# Patient Record
Sex: MALE | Race: WHITE | NOT HISPANIC OR LATINO | Employment: OTHER | URBAN - METROPOLITAN AREA
[De-identification: names, ages, dates, MRNs, and addresses within clinical notes are randomized per-mention and may not be internally consistent; named-entity substitution may affect disease eponyms.]

---

## 2021-04-19 ENCOUNTER — HOSPITAL ENCOUNTER (EMERGENCY)
Facility: CLINIC | Age: 31
Discharge: HOME | End: 2021-04-19
Attending: EMERGENCY MEDICINE | Admitting: EMERGENCY MEDICINE
Payer: COMMERCIAL

## 2021-04-19 ENCOUNTER — APPOINTMENT (OUTPATIENT)
Dept: GENERAL RADIOLOGY | Facility: CLINIC | Age: 31
End: 2021-04-19
Attending: EMERGENCY MEDICINE
Payer: COMMERCIAL

## 2021-04-19 VITALS
HEART RATE: 77 BPM | RESPIRATION RATE: 13 BRPM | TEMPERATURE: 98 F | OXYGEN SATURATION: 100 % | SYSTOLIC BLOOD PRESSURE: 131 MMHG | DIASTOLIC BLOOD PRESSURE: 81 MMHG

## 2021-04-19 DIAGNOSIS — R07.9 CHEST PAIN, UNSPECIFIED TYPE: ICD-10-CM

## 2021-04-19 LAB
ANION GAP SERPL CALCULATED.3IONS-SCNC: 4 MMOL/L (ref 3–14)
BASOPHILS # BLD AUTO: 0.1 10E9/L (ref 0–0.2)
BASOPHILS NFR BLD AUTO: 0.8 %
BUN SERPL-MCNC: 9 MG/DL (ref 7–30)
CALCIUM SERPL-MCNC: 9.2 MG/DL (ref 8.5–10.1)
CHLORIDE SERPL-SCNC: 106 MMOL/L (ref 94–109)
CO2 SERPL-SCNC: 28 MMOL/L (ref 20–32)
CREAT SERPL-MCNC: 0.89 MG/DL (ref 0.66–1.25)
DIFFERENTIAL METHOD BLD: ABNORMAL
EOSINOPHIL # BLD AUTO: 0.8 10E9/L (ref 0–0.7)
EOSINOPHIL NFR BLD AUTO: 10.4 %
ERYTHROCYTE [DISTWIDTH] IN BLOOD BY AUTOMATED COUNT: 12.5 % (ref 10–15)
GFR SERPL CREATININE-BSD FRML MDRD: >90 ML/MIN/{1.73_M2}
GLUCOSE SERPL-MCNC: 96 MG/DL (ref 70–99)
HCT VFR BLD AUTO: 39.6 % (ref 40–53)
HGB BLD-MCNC: 12.9 G/DL (ref 13.3–17.7)
IMM GRANULOCYTES # BLD: 0 10E9/L (ref 0–0.4)
IMM GRANULOCYTES NFR BLD: 0.1 %
LYMPHOCYTES # BLD AUTO: 2.2 10E9/L (ref 0.8–5.3)
LYMPHOCYTES NFR BLD AUTO: 29.6 %
MCH RBC QN AUTO: 29.1 PG (ref 26.5–33)
MCHC RBC AUTO-ENTMCNC: 32.6 G/DL (ref 31.5–36.5)
MCV RBC AUTO: 89 FL (ref 78–100)
MONOCYTES # BLD AUTO: 0.6 10E9/L (ref 0–1.3)
MONOCYTES NFR BLD AUTO: 8.1 %
NEUTROPHILS # BLD AUTO: 3.8 10E9/L (ref 1.6–8.3)
NEUTROPHILS NFR BLD AUTO: 51 %
NRBC # BLD AUTO: 0 10*3/UL
NRBC BLD AUTO-RTO: 0 /100
PLATELET # BLD AUTO: 362 10E9/L (ref 150–450)
POTASSIUM SERPL-SCNC: 3.8 MMOL/L (ref 3.4–5.3)
RBC # BLD AUTO: 4.44 10E12/L (ref 4.4–5.9)
SODIUM SERPL-SCNC: 138 MMOL/L (ref 133–144)
TROPONIN I SERPL-MCNC: <0.015 UG/L (ref 0–0.04)
WBC # BLD AUTO: 7.4 10E9/L (ref 4–11)

## 2021-04-19 PROCEDURE — 84484 ASSAY OF TROPONIN QUANT: CPT | Performed by: EMERGENCY MEDICINE

## 2021-04-19 PROCEDURE — 71046 X-RAY EXAM CHEST 2 VIEWS: CPT

## 2021-04-19 PROCEDURE — 85025 COMPLETE CBC W/AUTO DIFF WBC: CPT | Performed by: EMERGENCY MEDICINE

## 2021-04-19 PROCEDURE — 93005 ELECTROCARDIOGRAM TRACING: CPT

## 2021-04-19 PROCEDURE — 96374 THER/PROPH/DIAG INJ IV PUSH: CPT

## 2021-04-19 PROCEDURE — 250N000011 HC RX IP 250 OP 636: Performed by: EMERGENCY MEDICINE

## 2021-04-19 PROCEDURE — 80048 BASIC METABOLIC PNL TOTAL CA: CPT | Performed by: EMERGENCY MEDICINE

## 2021-04-19 PROCEDURE — 99285 EMERGENCY DEPT VISIT HI MDM: CPT | Mod: 25

## 2021-04-19 RX ORDER — OMEPRAZOLE 40 MG/1
40 CAPSULE, DELAYED RELEASE ORAL DAILY
Qty: 14 CAPSULE | Refills: 0 | Status: SHIPPED | OUTPATIENT
Start: 2021-04-19

## 2021-04-19 RX ORDER — KETOROLAC TROMETHAMINE 15 MG/ML
15 INJECTION, SOLUTION INTRAMUSCULAR; INTRAVENOUS ONCE
Status: COMPLETED | OUTPATIENT
Start: 2021-04-19 | End: 2021-04-19

## 2021-04-19 RX ADMIN — KETOROLAC TROMETHAMINE 15 MG: 15 INJECTION, SOLUTION INTRAMUSCULAR; INTRAVENOUS at 11:17

## 2021-04-19 ASSESSMENT — ENCOUNTER SYMPTOMS
ARTHRALGIAS: 1
CHILLS: 0
FEVER: 0
NAUSEA: 1
VOMITING: 0

## 2021-04-19 NOTE — ED PROVIDER NOTES
History   Chief Complaint  Chest Pain    HPI  Yoandy Tong is a 30 year old male with a history of klinefelter's syndrome, GERD, anemia, and asthma, who presents for evaluation of chest pain. The patient reports that he has been experiencing intermittent episodes of left sided chest pain for the past two weeks now. These episodes have been getting longer in duration as of late. He has been taking Aspirin for this pain and it usually gives him an hour or two of relief. The patient also reports some left arm pain, leg pain, nausea, and chest discomfort when taking a deep breath. Denies any fevers, chills, or vomiting.     Review of Systems   Constitutional: Negative for chills and fever.   Cardiovascular: Positive for chest pain.   Gastrointestinal: Positive for nausea. Negative for vomiting.   Musculoskeletal: Positive for arthralgias.   All other systems reviewed and are negative.    Allergies  The patient has no known drug allergies.     Medications  Advair  Albuterol  Naproxen  Omeprazole    Past Medical History  Asthma  Hypogonadism  Scoliosis  Anemia  GERD  Klinefelter's syndrome    Past Surgical History  Orchiectomy  Loop recorder implant  Ablation     Family History  Lipids    Social History  Presents to the ED with his father.     Physical Exam     Patient Vitals for the past 24 hrs:   BP Temp Pulse Resp SpO2   04/19/21 1130 125/78 -- 75 13 99 %   04/19/21 1115 127/84 -- 73 18 100 %   04/19/21 1100 -- -- 73 11 100 %   04/19/21 1045 -- -- -- -- 100 %   04/19/21 1030 103/82 -- 84 25 100 %   04/19/21 1015 129/82 -- -- -- --   04/19/21 0941 126/81 98  F (36.7  C) 83 18 100 %     Physical Exam  Constitutional: Alert, attentive  HENT:    Nose: Nose normal.    Mouth/Throat: Oropharynx is clear, mucous membranes are moist   Eyes: EOM are normal.   CV: regular rate and rhythm; no murmurs, rubs or gallups  Chest: Effort normal and breath sounds normal.   GI:  There is no tenderness. No distension. Normal bowel  sounds  MSK: Normal range of motion.   Neurological: Alert, attentive  Skin: Skin is warm and dry.      Emergency Department Course   ECG:   ECG taken at 0958, ECG read at 1020  Normal sinus rhythm. Normal ECG.   Rate 75 bpm. TX interval 172 ms. QRS duration 86 ms. QT/QTc 398/444 ms. P-R-T axes 13 43 36.    Imaging:  XR Chest 2 views:   PA and lateral views of the chest were obtained. Cardiomediastinal silhouette is within normal limits. No suspicious focal pulmonary opacities. No significant pleural effusion or pneumothorax. Degenerative changes of the spine.  As per radiology.     Laboratory:  CBC: WBC: 7.4, HGB: 12.9 (L), PLT: 362  BMP: All WNL (Creatinine: 0.89)  1022 Troponin I: <0.015    Emergency Department Course:  Reviewed:  I reviewed nursing notes, vitals and past medical history    Assessments:  1017 I physically examined the patient as documented above.    1118 I rechecked the patient and updated him on the findings of his workup.    Interventions:  1117 Toradol 15 mg IV    Disposition:  The patient was discharged to home.     Impression & Plan   Medical Decision Making:  This is a 30-year-old male who presents for evaluation of atypical chest pain for 2 weeks.  Pain has been essentially constant in the last 2 to 3 days over the weekend.  Differential includes PE, pericarditis, among others.  Given longstanding and atypical symptoms, his negative EKG and troponin essentially rule out AMI.  He is PERC negative, essentially ruling out PE.  Chest x-ray shows no widened mediastinum, pneumothorax, or pneumonia.  He has had issues of GERD related to hiatal hernia, but these are usually more central to the right upper quadrant.  Discussed anti-inflammatory and/or Tums versus omeprazole therapy, and he prefers a trial of the latter.  Plan primary care follow-up in 3 to 5 days for recheck and return precautions for worse pain, shortness of breath, or any other concerns.    Diagnosis:    ICD-10-CM    1. Chest  pain, unspecified type  R07.9      Discharge Medications:  New Prescriptions    OMEPRAZOLE (PRILOSEC) 40 MG DR CAPSULE    Take 1 capsule (40 mg) by mouth daily     Scribe Disclosure:  I, Carmelo Lee, am serving as a scribe at 10:11 AM on 4/19/2021 to document services personally performed by Glen Nichole MD based on my observations and the provider's statements to me.      Glen Nichole MD  04/19/21 0313

## 2021-04-19 NOTE — ED TRIAGE NOTES
Patient presents to the ED reporting intermittent episodes of chest pain that have been increasing over the past 2 weeks. Reports a history of a previous ablation. States has shooting pains to right leg as well.

## 2021-04-20 LAB — INTERPRETATION ECG - MUSE: NORMAL

## 2021-07-04 ENCOUNTER — HEALTH MAINTENANCE LETTER (OUTPATIENT)
Age: 31
End: 2021-07-04

## 2021-10-24 ENCOUNTER — HEALTH MAINTENANCE LETTER (OUTPATIENT)
Age: 31
End: 2021-10-24

## 2022-07-31 ENCOUNTER — HEALTH MAINTENANCE LETTER (OUTPATIENT)
Age: 32
End: 2022-07-31

## 2022-10-15 ENCOUNTER — HEALTH MAINTENANCE LETTER (OUTPATIENT)
Age: 32
End: 2022-10-15

## 2023-08-20 ENCOUNTER — HEALTH MAINTENANCE LETTER (OUTPATIENT)
Age: 33
End: 2023-08-20

## 2024-05-10 ENCOUNTER — APPOINTMENT (OUTPATIENT)
Dept: GENERAL RADIOLOGY | Facility: CLINIC | Age: 34
End: 2024-05-10
Attending: EMERGENCY MEDICINE
Payer: COMMERCIAL

## 2024-05-10 ENCOUNTER — HOSPITAL ENCOUNTER (EMERGENCY)
Facility: CLINIC | Age: 34
Discharge: HOME | End: 2024-05-10
Attending: EMERGENCY MEDICINE | Admitting: EMERGENCY MEDICINE
Payer: COMMERCIAL

## 2024-05-10 VITALS
DIASTOLIC BLOOD PRESSURE: 73 MMHG | HEART RATE: 69 BPM | WEIGHT: 195 LBS | OXYGEN SATURATION: 98 % | HEIGHT: 76 IN | RESPIRATION RATE: 18 BRPM | SYSTOLIC BLOOD PRESSURE: 121 MMHG | BODY MASS INDEX: 23.75 KG/M2 | TEMPERATURE: 97.3 F

## 2024-05-10 DIAGNOSIS — L03.115 CELLULITIS OF RIGHT LOWER LEG: ICD-10-CM

## 2024-05-10 LAB
ANION GAP SERPL CALCULATED.3IONS-SCNC: 9 MMOL/L (ref 7–15)
BASOPHILS # BLD AUTO: 0.1 10E3/UL (ref 0–0.2)
BASOPHILS NFR BLD AUTO: 1 %
BUN SERPL-MCNC: 8.7 MG/DL (ref 6–20)
CALCIUM SERPL-MCNC: 9.2 MG/DL (ref 8.6–10)
CHLORIDE SERPL-SCNC: 107 MMOL/L (ref 98–107)
CREAT SERPL-MCNC: 0.81 MG/DL (ref 0.67–1.17)
CRP SERPL-MCNC: 4.21 MG/L
DEPRECATED HCO3 PLAS-SCNC: 26 MMOL/L (ref 22–29)
EGFRCR SERPLBLD CKD-EPI 2021: >90 ML/MIN/1.73M2
EOSINOPHIL # BLD AUTO: 0.7 10E3/UL (ref 0–0.7)
EOSINOPHIL NFR BLD AUTO: 8 %
ERYTHROCYTE [DISTWIDTH] IN BLOOD BY AUTOMATED COUNT: 13.1 % (ref 10–15)
GLUCOSE SERPL-MCNC: 90 MG/DL (ref 70–99)
HCT VFR BLD AUTO: 34.4 % (ref 40–53)
HGB BLD-MCNC: 11.5 G/DL (ref 13.3–17.7)
HOLD SPECIMEN: NORMAL
HOLD SPECIMEN: NORMAL
IMM GRANULOCYTES # BLD: 0 10E3/UL
IMM GRANULOCYTES NFR BLD: 0 %
LACTATE SERPL-SCNC: 0.7 MMOL/L (ref 0.7–2)
LYMPHOCYTES # BLD AUTO: 2.5 10E3/UL (ref 0.8–5.3)
LYMPHOCYTES NFR BLD AUTO: 29 %
MCH RBC QN AUTO: 29.1 PG (ref 26.5–33)
MCHC RBC AUTO-ENTMCNC: 33.4 G/DL (ref 31.5–36.5)
MCV RBC AUTO: 87 FL (ref 78–100)
MONOCYTES # BLD AUTO: 0.8 10E3/UL (ref 0–1.3)
MONOCYTES NFR BLD AUTO: 10 %
NEUTROPHILS # BLD AUTO: 4.7 10E3/UL (ref 1.6–8.3)
NEUTROPHILS NFR BLD AUTO: 52 %
NRBC # BLD AUTO: 0 10E3/UL
NRBC BLD AUTO-RTO: 0 /100
PLATELET # BLD AUTO: 331 10E3/UL (ref 150–450)
POTASSIUM SERPL-SCNC: 3.9 MMOL/L (ref 3.4–5.3)
RBC # BLD AUTO: 3.95 10E6/UL (ref 4.4–5.9)
SODIUM SERPL-SCNC: 142 MMOL/L (ref 135–145)
WBC # BLD AUTO: 8.8 10E3/UL (ref 4–11)

## 2024-05-10 PROCEDURE — 85025 COMPLETE CBC W/AUTO DIFF WBC: CPT | Performed by: EMERGENCY MEDICINE

## 2024-05-10 PROCEDURE — 86140 C-REACTIVE PROTEIN: CPT | Performed by: EMERGENCY MEDICINE

## 2024-05-10 PROCEDURE — 83605 ASSAY OF LACTIC ACID: CPT | Performed by: EMERGENCY MEDICINE

## 2024-05-10 PROCEDURE — 96375 TX/PRO/DX INJ NEW DRUG ADDON: CPT

## 2024-05-10 PROCEDURE — 250N000011 HC RX IP 250 OP 636: Performed by: EMERGENCY MEDICINE

## 2024-05-10 PROCEDURE — 36415 COLL VENOUS BLD VENIPUNCTURE: CPT | Performed by: EMERGENCY MEDICINE

## 2024-05-10 PROCEDURE — 73590 X-RAY EXAM OF LOWER LEG: CPT | Mod: RT

## 2024-05-10 PROCEDURE — 99285 EMERGENCY DEPT VISIT HI MDM: CPT | Mod: 25

## 2024-05-10 PROCEDURE — 96365 THER/PROPH/DIAG IV INF INIT: CPT

## 2024-05-10 PROCEDURE — 76882 US LMTD JT/FCL EVL NVASC XTR: CPT

## 2024-05-10 PROCEDURE — 80048 BASIC METABOLIC PNL TOTAL CA: CPT | Performed by: EMERGENCY MEDICINE

## 2024-05-10 RX ORDER — CEFUROXIME AXETIL 500 MG/1
500 TABLET ORAL 2 TIMES DAILY
Qty: 12 TABLET | Refills: 0 | Status: SHIPPED | OUTPATIENT
Start: 2024-05-11 | End: 2024-05-17

## 2024-05-10 RX ORDER — ONDANSETRON 2 MG/ML
4 INJECTION INTRAMUSCULAR; INTRAVENOUS ONCE
Status: COMPLETED | OUTPATIENT
Start: 2024-05-10 | End: 2024-05-10

## 2024-05-10 RX ORDER — CEFTRIAXONE 1 G/1
1 INJECTION, POWDER, FOR SOLUTION INTRAMUSCULAR; INTRAVENOUS ONCE
Status: COMPLETED | OUTPATIENT
Start: 2024-05-10 | End: 2024-05-10

## 2024-05-10 RX ORDER — ONDANSETRON 4 MG/1
4 TABLET, ORALLY DISINTEGRATING ORAL EVERY 8 HOURS PRN
Qty: 8 TABLET | Refills: 0 | Status: SHIPPED | OUTPATIENT
Start: 2024-05-10

## 2024-05-10 RX ADMIN — CEFTRIAXONE 1 G: 1 INJECTION, POWDER, FOR SOLUTION INTRAMUSCULAR; INTRAVENOUS at 16:55

## 2024-05-10 RX ADMIN — ONDANSETRON 4 MG: 2 INJECTION INTRAMUSCULAR; INTRAVENOUS at 16:52

## 2024-05-10 ASSESSMENT — ACTIVITIES OF DAILY LIVING (ADL)
ADLS_ACUITY_SCORE: 35

## 2024-05-10 ASSESSMENT — COLUMBIA-SUICIDE SEVERITY RATING SCALE - C-SSRS
1. IN THE PAST MONTH, HAVE YOU WISHED YOU WERE DEAD OR WISHED YOU COULD GO TO SLEEP AND NOT WAKE UP?: NO
6. HAVE YOU EVER DONE ANYTHING, STARTED TO DO ANYTHING, OR PREPARED TO DO ANYTHING TO END YOUR LIFE?: NO

## 2024-05-10 NOTE — LETTER
May 10, 2024      To Whom It May Concern:      Yoandy Tong was seen in our Emergency Department today, 05/10/24.  I expect his condition to improve over the next 1-3 days.  He may return to work/school when improved.    Sincerely,        Leslie HARGROVE RN

## 2024-05-10 NOTE — DISCHARGE INSTRUCTIONS
Please monitor symptoms closely.  You may begin the antibiotic for treatment of possible Streptococcus infection as a cause for your ongoing skin infection.    If you develop nausea, you may try Zofran to help alleviate the symptoms.  If you are otherwise intolerant of this medication or develop any other severe reaction, please discontinue and seek immediate reevaluation.    Return to the ER if you develop increased redness, pain, swelling, fevers, chills or any other concerns.    Follow-up with primary care provider in 2 to 3 days for recheck.

## 2024-05-10 NOTE — ED TRIAGE NOTES
Pt presents to ED from . Pt is being treated for infection to the right leg. Today the redness and pain has spread. Sent to ED as pt has multiple abx allergies. Pt unable to recall which, but states they are on file with P.N.

## 2024-05-10 NOTE — ED PROVIDER NOTES
Emergency Department Note      History of Present Illness     Chief Complaint  Cellulitis    LISSA Tong is a 33 year old male presenting to the ER for evaluation of cellulitis.  Patient reports his symptoms initially began early in April.  He noted a sore over the anterior aspect of his right leg, and did ultimately seek care on April 21st. Venous US at that time negative for DVT.  He was started on doxycycline, which he notes caused GI upset, though did not significantly improve his symptoms.  He notes ongoing pain, and swelling, and also noted some spreading redness extending beyond region outlined 2 days prior.  No fevers or chills.  Does note some achy feeling and discomfort to his right knee and inguinal region without erythema or focal swelling.      Independent Historian  None    Review of External Notes  Urgent care note reviewed from earlier today where patient was seen for a wound to the right lower leg.  This initially was evaluated on 4/21 and he was started on doxycycline.  He states that this has been getting worse.    Past Medical History   Medical History and Problem List  Atrial tachycardia  Hyperprolactinemia  Gynecomastia  Anemia  Eosinophilic esophagitis  Chronic musculoskeletal pain  Dizziness and giddiness  Dysphagia  Shortness of breath  Klinefelter syndrome  Asthma    Medications  [START ON 5/11/2024] cefuroxime (CEFTIN) 500 MG tablet  ondansetron (ZOFRAN ODT) 4 MG ODT tab  ADVAIR DISKUS 250-50 MCG/DOSE IN MISC  albuterol (2.5 MG/3ML) 0.083% nebulizer solution  albuterol (PROAIR HFA) 108 (90 BASE) MCG/ACT inhaler  ALBUTEROL SULFATE (2.5 MG/3ML) 0.083% IN NEBU  Albuterol Sulfate (VENTOLIN HFA) 108 (90 BASE) MCG/ACT AERS  naproxen (NAPROSYN) 500 MG tablet  omeprazole (PRILOSEC) 20 MG capsule  omeprazole (PRILOSEC) 40 MG DR capsule  PROAIR  (90 BASE) MCG/ACT IN AERS        Surgical History   No past surgical history on file.  Physical Exam   Patient Vitals for the past 24 hrs:    "BP Temp Temp src Pulse Resp SpO2 Height Weight   05/10/24 1608 -- -- -- -- -- 100 % -- --   05/10/24 1607 -- -- -- -- -- 97 % -- --   05/10/24 1606 -- -- -- -- -- 99 % -- --   05/10/24 1605 -- -- -- -- -- 99 % -- --   05/10/24 1604 -- -- -- -- -- 100 % -- --   05/10/24 1600 128/79 -- -- 81 -- 99 % -- --   05/10/24 1542 114/73 -- -- 76 -- 98 % -- --   05/10/24 1535 -- -- -- -- -- -- 1.93 m (6' 4\") 88.5 kg (195 lb)   05/10/24 1510 -- -- -- -- 18 100 % -- --   05/10/24 1502 -- -- -- -- -- 100 % -- --   05/10/24 1452 -- -- -- -- -- 100 % -- --   05/10/24 1442 132/78 -- -- 71 -- -- -- --   05/10/24 1349 121/73 -- -- -- -- -- -- --   05/10/24 1344 -- 97.3  F (36.3  C) Temporal 77 -- 100 % -- --     Physical Exam  General:   Well-nourished   Speaking in full sentences  Eyes:   Conjunctiva without injection or scleral icterus  ENT:   Moist mucous membranes   Nares patent   Pinnae normal  Neck:   Full ROM   No stiffness appreciated  Resp:   Lungs CTAB   No crackles, wheezing or audible rubs   Good air movement  CV:    Normal rate, regular rhythm   S1 and S2 present   No murmur, gallop or rub  GI:   BS present   Abdomen soft without distention   Non-tender to light and deep palpation   No guarding or rebound tenderness  Skin:   Warm, dry, well perfused   Wound noted to anterior aspect of RLE over distal shin   Surrounding erythema, warmth and tenderness   No crepitance   No expressible drainage   No erythema tracking proximally nor associated lymphadenopathy to inguinal region  MSK:   Moves all extremities   No focal deformities or swelling  Neuro:   Alert   Answers questions appropriately   Moves all extremities equally   Gait stable  Psych:   Normal affect, normal mood        Diagnostics   Lab Results   Labs Ordered and Resulted from Time of ED Arrival to Time of ED Departure   CBC WITH PLATELETS AND DIFFERENTIAL - Abnormal       Result Value    WBC Count 8.8      RBC Count 3.95 (*)     Hemoglobin 11.5 (*)     Hematocrit " 34.4 (*)     MCV 87      MCH 29.1      MCHC 33.4      RDW 13.1      Platelet Count 331      % Neutrophils 52      % Lymphocytes 29      % Monocytes 10      % Eosinophils 8      % Basophils 1      % Immature Granulocytes 0      NRBCs per 100 WBC 0      Absolute Neutrophils 4.7      Absolute Lymphocytes 2.5      Absolute Monocytes 0.8      Absolute Eosinophils 0.7      Absolute Basophils 0.1      Absolute Immature Granulocytes 0.0      Absolute NRBCs 0.0     BASIC METABOLIC PANEL - Normal    Sodium 142      Potassium 3.9      Chloride 107      Carbon Dioxide (CO2) 26      Anion Gap 9      Urea Nitrogen 8.7      Creatinine 0.81      GFR Estimate >90      Calcium 9.2      Glucose 90     LACTIC ACID WHOLE BLOOD - Normal    Lactic Acid 0.7     CRP INFLAMMATION - Normal    CRP Inflammation 4.21         Imaging  XR Tibia & Fibula Right 2 Views   Final Result   IMPRESSION: Negative tibia and fibula. No fracture.      JORGITO MOSES MD            SYSTEM ID:  [id number]      POC US SOFT TISSUE   Final Result     Soft Tissue Ultrasound      Procedure Name: POC Ultrasound Soft Tissue Exam      Indication: Swelling and Erythema      Views: Skin and Subcutaneous tissue location to right lower leg      Findings: No fluid collection, cobblestoning present      Impression: No evidence of abscess      Study performed by: Ajit Weinstein MD       Images archived: Yes           Independent Interpretation  XR imaging reviewed.  No acute fracture or foreign body is noted.      ED Course    Medications Administered  Medications   cefTRIAXone (ROCEPHIN) 1 g vial to attach to  mL bag for ADULTS or NS 50 mL bag for PEDS (has no administration in time range)   ondansetron (ZOFRAN) injection 4 mg (4 mg Intravenous $Given 5/10/24 1652)       Procedures  Procedures     Discussion of Management  Infectious disease, Dr. Leggett    Social Determinants of Health adding to complexity of care  None    ED Course  ED Course as of 05/10/24 5288    Fri May 10, 2024   1503 Patient re-evaluated. Bedside US performed.   8550 Spoke with Dr. Leggett from ID     Medical Decision Making / Diagnosis   CMS Diagnoses: None    MIPS     None    [facility]  Yoandy Tong is a 33 year old male presenting to the ER with concerns for cellulitis, referred from urgent care.  VS on presentation unremarkable.  History, exam, and ED course as noted above.  Based on presenting symptoms, concern exists for ongoing cellulitis, failing initial round of doxycycline.  Etiology for skin infection includes both streptococcal as well as staphylococcal species.  As patient has not noted significant improvement with doxycycline, and does not exhibit otherwise purulent drainage or deeper space abscess, concern exists for streptococcal infection.  I do acknowledge patient's prior history of MRSA, though given previous treatment with doxycycline, and following discussion with Dr. Choi of infectious disease, acknowledging patient's allergy profile, she has recommended additional treatment for better streptococcal coverage.  Given patient's allergy profile, Bactrim and clindamycin are not options.  He has previously tolerated penicillin drugs per chart review, and also notes nausea as a side effect to Keflex.  He was provided a dose of Rocephin as well as Zofran, and is open to trialing a additional cephalosporin class drug, with additional antiemetic therapy available should need be.  He is also understanding of the need to discontinue medication and seek immediate reevaluation she develop any other concerning reactions to this drug.  He systemically appears well, denies fevers, or other constitutional symptoms.  His labs reveal normal WBC count, absence of left shift, normal CRP, and normal lactate.  Metabolic function grossly unremarkable.  I also considered necrotizing infection though clinically feel this to be unlikely in the absence of crepitance, soft tissue gas, and given duration of symptoms.  LRINEC also correlates with low risk.  With reasonable clinical certainty I feel further advanced imaging can be deferred safely, and otherwise feel patient is appropriate for outpatient management.  He will be signed out to my colleague of the overnight shift pending he tolerates IV cephalosporins without difficulty.  Should he do well with this, anticipate he will be appropriate for discharge with ongoing outpatient treatment.  Recommended close follow-up with primary care provider in 2 to 3 days for recheck.  Return to ER in the meantime with any new or troubling symptoms such as increasing pain, spreading redness, drainage, fevers, chills, or any other concerns.  Patient comfortable with outlined plan of care and questions answered prior to discharge.    Disposition  The patient was discharged.     ICD-10 Codes:    ICD-10-CM    1. Cellulitis of right lower leg  L03.115            Discharge Medications  New Prescriptions    CEFUROXIME (CEFTIN) 500 MG TABLET    Take 1 tablet (500 mg) by mouth 2 times daily for 6 days    ONDANSETRON (ZOFRAN ODT) 4 MG ODT TAB    Take 1 tablet (4 mg) by mouth every 8 hours as needed         Ajit Weinstein MD    [employer]        Ajit Weinstein MD  05/10/24 1692

## 2024-10-13 ENCOUNTER — HEALTH MAINTENANCE LETTER (OUTPATIENT)
Age: 34
End: 2024-10-13